# Patient Record
Sex: FEMALE | Race: WHITE | NOT HISPANIC OR LATINO | URBAN - METROPOLITAN AREA
[De-identification: names, ages, dates, MRNs, and addresses within clinical notes are randomized per-mention and may not be internally consistent; named-entity substitution may affect disease eponyms.]

---

## 2024-11-11 ENCOUNTER — OFFICE VISIT (OUTPATIENT)
Dept: PLASTIC SURGERY | Facility: CLINIC | Age: 19
End: 2024-11-11

## 2024-11-11 VITALS — BODY MASS INDEX: 35.12 KG/M2 | WEIGHT: 186 LBS | HEIGHT: 61 IN

## 2024-11-11 DIAGNOSIS — F64.9 GENDER DYSPHORIA: Primary | ICD-10-CM

## 2024-11-11 PROCEDURE — 99214 OFFICE O/P EST MOD 30 MIN: CPT | Performed by: PHYSICIAN ASSISTANT

## 2024-11-11 PROCEDURE — 3008F BODY MASS INDEX DOCD: CPT | Performed by: PHYSICIAN ASSISTANT

## 2024-11-11 PROCEDURE — 99204 OFFICE O/P NEW MOD 45 MIN: CPT | Performed by: PHYSICIAN ASSISTANT

## 2024-11-11 RX ORDER — TESTOSTERONE CYPIONATE 1000 MG/10ML
INJECTION, SOLUTION INTRAMUSCULAR
COMMUNITY

## 2024-11-11 RX ORDER — PROPRANOLOL HYDROCHLORIDE AND HYDROCHLOROTHIAZIDE 40; 25 MG/1; MG/1
1 TABLET ORAL DAILY
COMMUNITY

## 2024-11-11 NOTE — PROGRESS NOTES
Department of Plastic and Reconstructive Surgery           Initial Office Consultation    Date: 11/11/24  Primary Care Provider: No primary care provider on file.    Donny Shankar is a 19 y.o. female who was self-referred for evaluation of chest masculinizing gender affirming top surgery.      Patient is a pleasant 18yo transmale who presents to discuss gender affirming top surgery. Preferred pronouns are he/him. He endorses he has been socially transitioned since 2021. He states that he has been taking weekly testosterone injections for approx 1.5 years starting in 5/2023. He endorses he uses a chest binder daily. He states his goal of surgery to decrease his dysphoria and make him more comfortable in his body. He follows with his providers at [facility] that he notes have provided him with a letter of support for top surgery. He is otherwise healthy with no other known health concerns.       PMH: migraines  Surgical Hx: none  Family Hx: father-skin ca, PGM DM, MGM&MGF CHF  Smoking: daily marijuana use      Review of Systems   All other systems have been reviewed with the patient and have been found to be negative with exception to the chief complaint as mentioned in the history of present illness.    ROS: As noted in history of present illness    - CONSTITUTIONAL: Denies weight loss, fever and chills.  - HEENT: Denies changes in vision and hearing.  - RESPIRATORY: Denies SOB and cough, difficulty breathing  - CV: Denies palpitations and CP  - GI: Denies abdominal pain, nausea, vomiting and diarrhea.  - : Denies dysuria and urinary frequency.  - MSK: Denies myalgia and joint pain.  - SKIN: Denies rash and pruritus.  - NEUROLOGICAL: Denies headache and syncope.      Objective   Vital Signs: There were no vitals filed for this visit.    Gen: interactive and pleasant  Head: NCAT  Eyes: EOMI, PERRLA  Mouth: MMM  Throat: trachea midline  Cor: RRR  Pulm: nonlabored breathing  Abd: s/nt/nd  Neuro:  AAOx3  Ext: extremities perfused    Focused exam of the: chest                                R                  L  SN:NAC             28.5              30cm  NAC:IMF            17cm             16cm         BW                    14.5cm              14.5cm                                                                                             Ptosis Grade     3               3                                                           Assessment/Plan     Kedar Shankar is a 19 y.o. female  who was self-referred for evaluation of chest masculinizing gender affirming top surgery.      We reviewed the criteria for breast/chest masculinization surgery:    1. Persistent, well-documented gender dysphoria  2.  Capacity to make a fully informed decision and to consent for treatment  3.  Age of majority and given country  4.  If significant medical or mental health concerns are present, they must be reasonably well controlled  5.  1 mental health referral, indicating support and understanding of the proposed procedure    We discussed that hormone therapy is not a prerequisite.    We discussed that the patient is to provide our office with documentation that states that the patient demonstrated understanding about the risks of the procedure and the need to have assistance during recovery, and that the patient's decision is clear and consistent in his appointments with  providers. They also indicate that having top surgery will alleviate the stress around relationships as well as socializing without the need to conceal parts of his body due to dysphoria    We discussed double incision mastectomy with free nipple grafts. He reviewed post operative time line and recovery. We  reviewed the use of preveena incisional wound vac and AMANUEL drains. We discussed nipple grafts and general graft recovery and healing time. I advised patient will have pre-operative appointment with Dr Shannon to review surgical consent.     Plan:   Double  incision mastectomy with free nipple grafts  Patient to fax letter of support to our office    I spent 45 minutes with this patient. Greater than 50% of this time was spent in the counselling and/or coordination of care of this patient.  This note was created using voice recognition software and was not corrected for typographical or grammatical errors.    Signature: Cristine Bowen PA-C